# Patient Record
Sex: MALE | Race: OTHER | URBAN - METROPOLITAN AREA
[De-identification: names, ages, dates, MRNs, and addresses within clinical notes are randomized per-mention and may not be internally consistent; named-entity substitution may affect disease eponyms.]

---

## 2019-12-13 ENCOUNTER — EMERGENCY (EMERGENCY)
Facility: HOSPITAL | Age: 44
LOS: 0 days | Discharge: HOME | End: 2019-12-13
Attending: EMERGENCY MEDICINE | Admitting: EMERGENCY MEDICINE
Payer: MEDICAID

## 2019-12-13 VITALS
TEMPERATURE: 103 F | OXYGEN SATURATION: 96 % | HEART RATE: 103 BPM | SYSTOLIC BLOOD PRESSURE: 149 MMHG | HEIGHT: 71 IN | WEIGHT: 279.99 LBS | DIASTOLIC BLOOD PRESSURE: 86 MMHG | RESPIRATION RATE: 18 BRPM

## 2019-12-13 VITALS — HEART RATE: 90 BPM | TEMPERATURE: 100 F

## 2019-12-13 DIAGNOSIS — R10.13 EPIGASTRIC PAIN: ICD-10-CM

## 2019-12-13 DIAGNOSIS — R50.9 FEVER, UNSPECIFIED: ICD-10-CM

## 2019-12-13 DIAGNOSIS — R19.7 DIARRHEA, UNSPECIFIED: ICD-10-CM

## 2019-12-13 DIAGNOSIS — R10.9 UNSPECIFIED ABDOMINAL PAIN: ICD-10-CM

## 2019-12-13 PROCEDURE — 99283 EMERGENCY DEPT VISIT LOW MDM: CPT

## 2019-12-13 RX ORDER — IBUPROFEN 200 MG
800 TABLET ORAL ONCE
Refills: 0 | Status: COMPLETED | OUTPATIENT
Start: 2019-12-13 | End: 2019-12-13

## 2019-12-13 RX ORDER — ACETAMINOPHEN 500 MG
650 TABLET ORAL ONCE
Refills: 0 | Status: COMPLETED | OUTPATIENT
Start: 2019-12-13 | End: 2019-12-13

## 2019-12-13 RX ADMIN — Medication 800 MILLIGRAM(S): at 20:47

## 2019-12-13 RX ADMIN — Medication 650 MILLIGRAM(S): at 19:55

## 2019-12-13 NOTE — ED PROVIDER NOTE - NS ED ROS FT
Constitutional: + fevers. +chills.    Eyes:  No visual changes, eye pain or discharge.  ENMT:  No sore throat or runny nose.  Cardiac:  No chest pain, SOB or edema.   Respiratory:  No cough or respiratory distress. No hemoptysis. No history of asthma or RAD.  GI:  +abdominal pain. +nausea. +loose stools.  :  No dysuria, frequency or burning.  MS:  +myalgias.   Neuro:  No headache or weakness.  No LOC.  Skin:  No skin rash.   Endocrine: No history of thyroid disease or diabetes.

## 2019-12-13 NOTE — ED PROVIDER NOTE - PHYSICAL EXAMINATION
CONSTITUTIONAL: Well-developed; well-nourished; in no acute distress.   SKIN: warm, dry.   HEAD: Normocephalic; atraumatic.  EYES: EOMI, no conjunctival erythema.   ENT: No nasal discharge; airway clear.  NECK: Supple; non tender.  CARD: S1, S2 normal; no murmurs, gallops, or rubs. Regular rate and rhythm.   RESP: No wheezes, rales or rhonchi.  ABD: soft nondistended, nontender to palpation in all four quadrants.   : No CVA tenderness bilaterally.   EXT: Normal ROM.  No clubbing, cyanosis or edema.   NEURO: Alert, oriented, grossly unremarkable.   PSYCH: Cooperative, appropriate.

## 2019-12-13 NOTE — ED PROVIDER NOTE - ATTENDING CONTRIBUTION TO CARE
45 y/o male with no relevant PMHx presents to ED with chills, myalgias, epigastric discomfort and nausea x few hours.  Had 4 episodes of loose stools prior to arrival.  (+) sick contacts at home, wife.  No CP/SOB.  Feels better in ED.  PE:  agree with above.  A/P:  Viral Syndrome vs. Food Poisoning.  D/C home.  Supportive care.

## 2019-12-13 NOTE — ED PROVIDER NOTE - PATIENT PORTAL LINK FT
You can access the FollowMyHealth Patient Portal offered by [facility] by registering at the following website: [email or web address]. By joining [facility]’s FollowMyHealth portal, you will also be able to view your health information using other applications (apps) compatible with our system.

## 2019-12-13 NOTE — ED PROVIDER NOTE - CLINICAL SUMMARY MEDICAL DECISION MAKING FREE TEXT BOX
Feels better in ED.  Abdomen soft, non tender.  No complaints.  Tolerating PO.  Wishes to go home. VSS and return instructions discussed.

## 2019-12-13 NOTE — ED ADULT TRIAGE NOTE - CHIEF COMPLAINT QUOTE
pt sts "I felt really ache my stomach started hurting I felt nauseous". sts having diarrhea 4 time today. tender to epigastric area.

## 2019-12-13 NOTE — ED PROVIDER NOTE - OBJECTIVE STATEMENT
Patient is a 43 yo M w/ no pmh p/w chills. Patient states he felt chills, myalgias, mild epigastric abdominal pain, nausea and had 4 episodes of loose stools. 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 Patient's partner has similar symptoms. Denies chest pain, cough, vomiting, SOB, dysuria. Patient's epigastric abdominal pain was nonradiating, mild, intermittent, dull x few hours that began after eating a big fast food meal.

## 2021-07-23 NOTE — ED PROVIDER NOTE - WORK/EXCUSE FORM DATE
15-Dec-2019 Finasteride Pregnancy And Lactation Text: This medication is absolutely contraindicated during pregnancy. It is unknown if it is excreted in breast milk.